# Patient Record
Sex: MALE | Race: ASIAN | URBAN - METROPOLITAN AREA
[De-identification: names, ages, dates, MRNs, and addresses within clinical notes are randomized per-mention and may not be internally consistent; named-entity substitution may affect disease eponyms.]

---

## 2022-11-30 ENCOUNTER — OFFICE VISIT (OUTPATIENT)
Dept: CARDIOLOGY CLINIC | Age: 32
End: 2022-11-30

## 2022-11-30 VITALS
WEIGHT: 163 LBS | OXYGEN SATURATION: 99 % | HEART RATE: 66 BPM | SYSTOLIC BLOOD PRESSURE: 110 MMHG | BODY MASS INDEX: 25.58 KG/M2 | DIASTOLIC BLOOD PRESSURE: 66 MMHG | HEIGHT: 67 IN

## 2022-11-30 DIAGNOSIS — I49.9 CARDIAC ARRHYTHMIA, UNSPECIFIED CARDIAC ARRHYTHMIA TYPE: ICD-10-CM

## 2022-11-30 DIAGNOSIS — R07.9 CHEST PAIN, UNSPECIFIED TYPE: Primary | ICD-10-CM

## 2022-11-30 PROCEDURE — 99203 OFFICE O/P NEW LOW 30 MIN: CPT | Performed by: INTERNAL MEDICINE

## 2022-11-30 PROCEDURE — 93000 ELECTROCARDIOGRAM COMPLETE: CPT | Performed by: INTERNAL MEDICINE

## 2022-11-30 RX ORDER — PANTOPRAZOLE SODIUM 40 MG/1
40 TABLET, DELAYED RELEASE ORAL
Qty: 90 TABLET | Refills: 1 | Status: SHIPPED | OUTPATIENT
Start: 2022-11-30

## 2022-11-30 NOTE — PATIENT INSTRUCTIONS
Echo-- please call  and ask to talk to billing, they will be able to discuss price with you  Start protonix 40 mg daily- sent to [facility]  Follow up in 3 months

## 2022-11-30 NOTE — PROGRESS NOTES
[address]  11/30/22  Referring: Dr. Washington Bone CONSULT/CHIEF COMPLAINT/HPI     Reason for visit/ Chief complaint  New patient  Chest pain   HPI Mani Calderon is a 27 y.o.  seen as a new patient, self referred for chest pain. He grew up in [facility], moving here about 15 years ago. States in his high school years he had  a \"heart beat disorder\" treated with a medicine ( unsure of name). He was in [place] during this time, does not recall getting echo, stress test.  Never had any surgeries. Parents are still living, no known cardiac problems. His brother ( still lives in [facility]) has no cardiac issues. No family history of CAD in grandparents, uncles/aunts, cousins. He is a nonsmoker, never vaped. Occasionally drinks a beer. Does not take any supplements. Has never been hospitalized, does not have any sore throat history, rheumatic fever or TB. He is  ( 5 years) with one son ( in ) Works in a nail salon. Does not have a pcp, no insurance. One month ago he had an episode of chest pressure that lasted a few days. This is exacerbated by manual pressure. Also exacerbated by eating a lot of food, eating too fast, or,  when he works to much and doesn't get enough rest. The pain does not radiate, no associated palpitations dizziness or shortness of breath. Chest pressure came and went, lasting 30 minutes  He went to urgent care who would not see him, and referred him to ER. He did not go to ER, but called [id number] and got an appointment with cardiology. He was quite concerned that he had to wait a month to be seen. He started to take coenzyme q 10, and may be feeling a little better. He has not had any chest pain since. He has been very active, swims and works out several times a week, never gets chest pressure when he exercises. Eats a lot of spicy food. No exertional shortness of breath, palpitations or dizziness. Occasional dizziness if he stands up to quickly.   No snoring, orthopnea. Never passed out    Patient is adherent with medications and is tolerating them well without side effects     HISTORY/ALLERGIES/ROS     MedHx:  has no past medical history on file. SurgHx:  has no past surgical history on file. SocHx:  reports that he has never smoked. He has never used smokeless tobacco. He reports that he does not drink alcohol and does not use drugs. FamHx: No family history of premature coronary artery disease, sudden death, or aneurysm  Allergies: Patient has no known allergies. ROS:   Review of Systems   Constitutional:  Positive for fatigue. Negative for activity change, diaphoresis and fever. HENT:  Negative for congestion and ear discharge. Eyes:  Negative for photophobia and visual disturbance. Respiratory:  Positive for chest tightness. Negative for cough and shortness of breath. Cardiovascular:  Negative for chest pain and palpitations. Gastrointestinal:  Negative for abdominal distention, abdominal pain, blood in stool and nausea. Endocrine: Negative for cold intolerance and polydipsia. Genitourinary:  Negative for difficulty urinating and flank pain. Musculoskeletal:  Negative for arthralgias and myalgias. Skin:  Negative for rash and wound. Allergic/Immunologic: Negative for environmental allergies and immunocompromised state. Neurological:  Negative for dizziness and headaches. Hematological:  Negative for adenopathy. Does not bruise/bleed easily. Psychiatric/Behavioral:  Negative for confusion. The patient is not hyperactive.          MEDICATIONS      Prior to Admission medications    Not on File       PHYSICAL EXAM        Vitals:    11/30/22 1148   BP: 110/66   Pulse: 66   SpO2: 99%    Weight: 163 lb (73.9 kg)     Gen Alert, cooperative, no distress Heart  Regular rate and rhythm, no   murmur   Head Normocephalic, atraumatic, no abnormalities Abd  Soft, NT, +BS, no mass, no OM   Eyes PERRLA, conj/corn clear Ext  Ext nl, AT, no C/C,      edema   Nose Nares normal, no drain age, Non-tender Pulse 2+ and symmetric   Throat Lips, mucosa, tongue normal Skin Color/text/turg nl, no rash/lesions   Neck S/S, TM, NT, no bruit Psych Nl mood and affect   Lung CTA-B, unlabored, no DTP     Ch wall NT, no deform       LABS and Imaging     Relevant and available CV data reviewed  Echo/MRI: none  Cath: none  Holter:none  EKG personally interpreted: NSR, rsr   Stress:none  low Risk  low Complexity/Medical Decision Making  Outside/Care everywhere records Reviewed  Labs Reviewed  Prior Imaging, ekg, cath, echo reviewed when available  Medications reviewed  Old Notes reviewed  ASSESSMENT AND PLAN       chest pain  -     atypical  - differential: gerd (likely), esophageal spasm, msk, ischemia, pericarditis  Plan:  -     echo (patient is self pay- he will call billing to discuss pricing). Recommend labs and further evaluation. Patient states that he does not have insurance and does not want large bill  -     pantoprazole 40 mg daily to see if improves symptoms as they are often associated with food. No ischemic testing at this time per acc/aha 2021 chest pain guidelines as low risk     2. Previous history of \"heart beat disorder\"  -     advised him to talk with mother regarding history  - new problem  Plan:  - echocardiogram    Patient counseled on lifestyle modification, diet, and exercise. Follow Up:  3 months    Dr. Geraldine Goodman    Physician Attestation  The scribe for and in the presence of shabbir Benitez DO). The scribe Krystle Ba RN   may have prepopulated components of this note with my historical  intellectual property under my direct supervision. Any additions to this intellectual property were performed in my presence and at my direction. Furthermore, the content and accuracy of this note have been reviewed by shabbir Benitez DO).   11/30/2022 12:17 PM

## 2022-12-13 ASSESSMENT — ENCOUNTER SYMPTOMS
ABDOMINAL PAIN: 0
ABDOMINAL DISTENTION: 0
NAUSEA: 0
SHORTNESS OF BREATH: 0
COUGH: 0
PHOTOPHOBIA: 0
CHEST TIGHTNESS: 1
BLOOD IN STOOL: 0

## 2023-03-21 ENCOUNTER — TELEPHONE (OUTPATIENT)
Dept: CARDIOLOGY CLINIC | Age: 33
End: 2023-03-21

## 2023-03-23 NOTE — TELEPHONE ENCOUNTER
Phoned pt to schedule Echo and DKW ov, pt did not want to schedule now and states he will cb when he wants to schedule.